# Patient Record
Sex: MALE | Race: WHITE | HISPANIC OR LATINO | Employment: FULL TIME | URBAN - METROPOLITAN AREA
[De-identification: names, ages, dates, MRNs, and addresses within clinical notes are randomized per-mention and may not be internally consistent; named-entity substitution may affect disease eponyms.]

---

## 2022-01-25 ENCOUNTER — OFFICE VISIT (OUTPATIENT)
Dept: FAMILY MEDICINE CLINIC | Facility: CLINIC | Age: 32
End: 2022-01-25
Payer: COMMERCIAL

## 2022-01-25 VITALS
WEIGHT: 162 LBS | HEIGHT: 65 IN | SYSTOLIC BLOOD PRESSURE: 110 MMHG | RESPIRATION RATE: 18 BRPM | DIASTOLIC BLOOD PRESSURE: 70 MMHG | BODY MASS INDEX: 26.99 KG/M2 | OXYGEN SATURATION: 99 % | HEART RATE: 77 BPM | TEMPERATURE: 97.2 F

## 2022-01-25 DIAGNOSIS — Z00.00 ROUTINE ADULT HEALTH MAINTENANCE: Primary | ICD-10-CM

## 2022-01-25 DIAGNOSIS — Z11.1 ENCOUNTER FOR PPD TEST: ICD-10-CM

## 2022-01-25 PROCEDURE — 3725F SCREEN DEPRESSION PERFORMED: CPT | Performed by: NURSE PRACTITIONER

## 2022-01-25 PROCEDURE — 86580 TB INTRADERMAL TEST: CPT

## 2022-01-25 PROCEDURE — 3008F BODY MASS INDEX DOCD: CPT | Performed by: NURSE PRACTITIONER

## 2022-01-25 PROCEDURE — 99385 PREV VISIT NEW AGE 18-39: CPT | Performed by: NURSE PRACTITIONER

## 2022-01-25 RX ORDER — VITAMIN B COMPLEX
1 CAPSULE ORAL DAILY
COMMUNITY

## 2022-01-25 RX ORDER — MULTIVITAMIN
1 TABLET ORAL DAILY
COMMUNITY

## 2022-01-27 ENCOUNTER — APPOINTMENT (OUTPATIENT)
Dept: RADIOLOGY | Facility: CLINIC | Age: 32
End: 2022-01-27
Payer: COMMERCIAL

## 2022-01-27 ENCOUNTER — CLINICAL SUPPORT (OUTPATIENT)
Dept: FAMILY MEDICINE CLINIC | Facility: CLINIC | Age: 32
End: 2022-01-27

## 2022-01-27 DIAGNOSIS — R76.11 POSITIVE PPD: ICD-10-CM

## 2022-01-27 DIAGNOSIS — R76.11 POSITIVE PPD: Primary | ICD-10-CM

## 2022-01-27 LAB
INDURATION: ABNORMAL MM
TB SKIN TEST: POSITIVE

## 2022-01-27 PROCEDURE — 71046 X-RAY EXAM CHEST 2 VIEWS: CPT

## 2022-01-30 LAB
GAMMA INTERFERON BACKGROUND BLD IA-ACNC: 0.02 IU/ML
M TB IFN-G CD4+ T-CELLS BLD-ACNC: 0.06 IU/ML
M TB IFN-G CD4+ T-CELLS BLD-ACNC: 0.11 IU/ML
MITOGEN IGNF BLD-ACNC: >10 IU/ML
QUANTIFERON INCUBATION COMMENT: NORMAL
QUANTIFERON-TB GOLD PLUS: NEGATIVE
SERVICE CMNT-IMP: NORMAL

## 2022-01-31 ENCOUNTER — TELEPHONE (OUTPATIENT)
Dept: FAMILY MEDICINE CLINIC | Facility: CLINIC | Age: 32
End: 2022-01-31

## 2022-02-01 ENCOUNTER — OFFICE VISIT (OUTPATIENT)
Dept: FAMILY MEDICINE CLINIC | Facility: CLINIC | Age: 32
End: 2022-02-01
Payer: COMMERCIAL

## 2022-02-01 VITALS
WEIGHT: 161 LBS | TEMPERATURE: 97.1 F | DIASTOLIC BLOOD PRESSURE: 66 MMHG | HEART RATE: 64 BPM | SYSTOLIC BLOOD PRESSURE: 110 MMHG | HEIGHT: 65 IN | OXYGEN SATURATION: 99 % | BODY MASS INDEX: 26.82 KG/M2 | RESPIRATION RATE: 18 BRPM

## 2022-02-01 DIAGNOSIS — R76.11 POSITIVE PPD: Primary | ICD-10-CM

## 2022-02-01 PROCEDURE — 3008F BODY MASS INDEX DOCD: CPT | Performed by: NURSE PRACTITIONER

## 2022-02-01 PROCEDURE — 99213 OFFICE O/P EST LOW 20 MIN: CPT | Performed by: NURSE PRACTITIONER

## 2022-02-01 NOTE — PROGRESS NOTES
Assessment/Plan:    We discussed that given positive PPD, he most likely was vaccinated in [facility] as a child  Quantiferon and CXR were negative  Documentation of this was provided for pt and reassurance provided  Advised going forward, he should not be screened via tuberculin skin test given that he will continue to have a positive result    1  Positive PPD            There are no Patient Instructions on file for this visit  No follow-ups on file  Subjective:      Patient ID: Chio Godfrey is a 32 y o  male  Chief Complaint   Patient presents with    Follow-up     review results  [facility]       Following up today on tuberculosis screening  He had a positive PPD and then had a f/u Quantiferon and chest Xray  At previous visit, pt recalled having had a negative PPD in the past, but did not recall being vaccinated  The following portions of the patient's history were reviewed and updated as appropriate: allergies, current medications, past family history, past medical history, past social history, past surgical history and problem list     Review of Systems   Constitutional: Negative  Respiratory: Negative  Cardiovascular: Negative  Gastrointestinal: Negative  Neurological: Negative  Current Outpatient Medications   Medication Sig Dispense Refill    b complex vitamins capsule Take 1 capsule by mouth daily      Multiple Vitamin (multivitamin) tablet Take 1 tablet by mouth daily       No current facility-administered medications for this visit  Objective:    /66   Pulse 64   Temp (!) 97 1 °F (36 2 °C)   Resp 18   Ht 5' 5" (1 651 m)   Wt 73 kg (161 lb)   SpO2 99%   BMI 26 79 kg/m²        Physical Exam  Vitals and nursing note reviewed  Constitutional:       Appearance: Normal appearance  He is well-developed  Cardiovascular:      Heart sounds: No murmur heard        Pulmonary:      Effort: Pulmonary effort is normal       Breath sounds: Normal breath sounds  Skin:     General: Skin is warm and dry  Neurological:      Mental Status: He is alert     Psychiatric:         Mood and Affect: Mood normal          Behavior: Behavior normal                 EUGENIO Gee

## 2022-02-01 NOTE — LETTER
February 1, 2022     Patient: Joanie Britt   YOB: 1990   Date of Visit: 2/1/2022       To Whom it May Concern:    Joanie Britt is under my professional care  He had a negative Quantiferon gold blood for tuberculosis screening on 1/28/22 and a negative chest Xray  He is not infected with pulmonary tuberculosis  It is recommended that he continue with Quantiferon gold should he require future screening instead of a tuberculin skin test       If you have any questions or concerns, please don't hesitate to call           Sincerely,          EUGENIO Wallace        CC: No Recipients

## 2022-05-27 ENCOUNTER — OFFICE VISIT (OUTPATIENT)
Dept: FAMILY MEDICINE CLINIC | Facility: CLINIC | Age: 32
End: 2022-05-27
Payer: COMMERCIAL

## 2022-05-27 VITALS
DIASTOLIC BLOOD PRESSURE: 66 MMHG | HEART RATE: 61 BPM | TEMPERATURE: 97.7 F | BODY MASS INDEX: 26.82 KG/M2 | HEIGHT: 65 IN | OXYGEN SATURATION: 99 % | WEIGHT: 161 LBS | RESPIRATION RATE: 18 BRPM | SYSTOLIC BLOOD PRESSURE: 110 MMHG

## 2022-05-27 DIAGNOSIS — L60.8 TOENAIL DEFORMITY: ICD-10-CM

## 2022-05-27 DIAGNOSIS — R59.0 POSTERIOR AURICULAR LYMPHADENOPATHY: Primary | ICD-10-CM

## 2022-05-27 PROCEDURE — 3008F BODY MASS INDEX DOCD: CPT | Performed by: NURSE PRACTITIONER

## 2022-05-27 PROCEDURE — 3725F SCREEN DEPRESSION PERFORMED: CPT | Performed by: NURSE PRACTITIONER

## 2022-05-27 PROCEDURE — 1036F TOBACCO NON-USER: CPT | Performed by: NURSE PRACTITIONER

## 2022-05-27 PROCEDURE — 99213 OFFICE O/P EST LOW 20 MIN: CPT | Performed by: NURSE PRACTITIONER

## 2022-05-27 RX ORDER — AMOXICILLIN 875 MG/1
875 TABLET, COATED ORAL 2 TIMES DAILY
Qty: 20 TABLET | Refills: 0 | Status: SHIPPED | OUTPATIENT
Start: 2022-05-27 | End: 2022-06-06

## 2022-05-27 NOTE — PATIENT INSTRUCTIONS
Take medication with food  It is important that you take the entire course of antibiotics prescribed  May also take a probiotic of your choice to maintain healthy GI carlos  Can take some probiotic and yogurt with the medication  Supportive care discussed and advised  Advised to RTO for any worsening and no improvement  Follow up for no improvement and worsening of conditions  Patient advised and educated when to see immediate medical care

## 2022-05-27 NOTE — PROGRESS NOTES
Assessment/Plan:  Discussed that possible posterior auricular lymphadenopathy is reactive and will start amoxicillin and if does not improve in 3 to 4 weeks then will follow back and will consider ultrasound of area  1  Posterior auricular lymphadenopathy  -     amoxicillin (AMOXIL) 875 mg tablet; Take 1 tablet (875 mg total) by mouth 2 (two) times a day for 10 days    2  Toenail deformity  -     Ambulatory Referral to Podiatry; Future            Patient Instructions: Take medication with food  It is important that you take the entire course of antibiotics prescribed  May also take a probiotic of your choice to maintain healthy GI carlos  Can take some probiotic and yogurt with the medication  Supportive care discussed and advised  Advised to RTO for any worsening and no improvement  Follow up for no improvement and worsening of conditions  Patient advised and educated when to see immediate medical care  Return if symptoms worsen or fail to improve  No future appointments  Subjective:      Patient ID: Justus Billings is a 32 y o  male  Chief Complaint   Patient presents with   Dianna Root noted behind R ear       [id number]         Vitals:  /66   Pulse 61   Temp 97 7 °F (36 5 °C)   Resp 18   Ht 5' 5" (1 651 m)   Wt 73 kg (161 lb)   SpO2 99%   BMI 26 79 kg/m²     HPI  Patient stated that noticed lump behind his right ear couple of days and hurts when moves head towards left side  Stated that lump has gotten better slightly  Denies fever, chills and injury  Also stated that play sports and had injury of right great toe which has resolved but still has some dried blood under his toenail which does not cause any issues but would like to get rid of it   Denies any weight loss          PHQ-2/9 Depression Screening    Little interest or pleasure in doing things: 0 - not at all  Feeling down, depressed, or hopeless: 0 - not at all  PHQ-2 Score: 0  PHQ-2 Interpretation: Negative depression screen             The following portions of the patient's history were reviewed and updated as appropriate: allergies, current medications, past family history, past medical history, past social history, past surgical history and problem list       Review of Systems   Constitutional: Negative for chills, diaphoresis, fatigue, fever and unexpected weight change  HENT: Negative for congestion, dental problem, drooling, ear discharge, ear pain, facial swelling, hearing loss, mouth sores, nosebleeds, postnasal drip, rhinorrhea, sinus pressure, sinus pain, sneezing, sore throat, tinnitus, trouble swallowing and voice change  As noted in HPI     Respiratory: Negative for cough, chest tightness, shortness of breath and wheezing  Cardiovascular: Negative  Gastrointestinal: Negative for abdominal pain, constipation, diarrhea, nausea and vomiting  Musculoskeletal: Negative  Skin:        As noted in HPI     Neurological: Negative for dizziness, light-headedness and headaches  Objective:    Social History     Tobacco Use   Smoking Status Never Smoker   Smokeless Tobacco Never Used       Allergies: No Known Allergies      Current Outpatient Medications   Medication Sig Dispense Refill    amoxicillin (AMOXIL) 875 mg tablet Take 1 tablet (875 mg total) by mouth 2 (two) times a day for 10 days 20 tablet 0    b complex vitamins capsule Take 1 capsule by mouth daily      Multiple Vitamin (multivitamin) tablet Take 1 tablet by mouth daily       No current facility-administered medications for this visit  Physical Exam  Vitals reviewed  Constitutional:       Appearance: Normal appearance  He is well-developed  HENT:      Head: Normocephalic  Right Ear: Tympanic membrane, ear canal and external ear normal       Left Ear: Tympanic membrane, ear canal and external ear normal       Nose: Nose normal       Right Sinus: No maxillary sinus tenderness or frontal sinus tenderness        Left Sinus: No maxillary sinus tenderness or frontal sinus tenderness  Mouth/Throat:      Mouth: No oral lesions  Pharynx: No oropharyngeal exudate or posterior oropharyngeal erythema  Cardiovascular:      Rate and Rhythm: Normal rate and regular rhythm  Heart sounds: Normal heart sounds  Pulmonary:      Effort: Pulmonary effort is normal       Breath sounds: Normal breath sounds  Musculoskeletal:         General: Normal range of motion  Cervical back: Neck supple  Lymphadenopathy:      Head:      Right side of head: Posterior auricular adenopathy present  Left side of head: No posterior auricular adenopathy  Cervical:      Right cervical: No superficial or posterior cervical adenopathy  Left cervical: No superficial or posterior cervical adenopathy  Skin:     General: Skin is warm and dry  Comments: Dried blood noted under right toenail but no swelling noted of toenail   Neurological:      Mental Status: He is alert and oriented to person, place, and time  Psychiatric:         Behavior: Behavior normal          Thought Content:  Thought content normal          Judgment: Judgment normal                      Enrigue Collet, CRNP

## 2022-10-07 ENCOUNTER — OFFICE VISIT (OUTPATIENT)
Dept: FAMILY MEDICINE CLINIC | Facility: CLINIC | Age: 32
End: 2022-10-07
Payer: COMMERCIAL

## 2022-10-07 VITALS
DIASTOLIC BLOOD PRESSURE: 60 MMHG | OXYGEN SATURATION: 99 % | SYSTOLIC BLOOD PRESSURE: 90 MMHG | HEIGHT: 66 IN | WEIGHT: 158 LBS | RESPIRATION RATE: 16 BRPM | TEMPERATURE: 98 F | HEART RATE: 81 BPM | BODY MASS INDEX: 25.39 KG/M2

## 2022-10-07 DIAGNOSIS — Z13.29 SCREENING FOR THYROID DISORDER: ICD-10-CM

## 2022-10-07 DIAGNOSIS — K21.9 GASTROESOPHAGEAL REFLUX DISEASE WITHOUT ESOPHAGITIS: ICD-10-CM

## 2022-10-07 DIAGNOSIS — Z13.6 SCREENING FOR CARDIOVASCULAR CONDITION: ICD-10-CM

## 2022-10-07 DIAGNOSIS — R14.0 ABDOMINAL BLOATING: ICD-10-CM

## 2022-10-07 DIAGNOSIS — Z23 NEED FOR VACCINATION: ICD-10-CM

## 2022-10-07 DIAGNOSIS — Z00.00 ROUTINE ADULT HEALTH MAINTENANCE: Primary | ICD-10-CM

## 2022-10-07 PROBLEM — E73.9 LACTOSE INTOLERANCE: Status: ACTIVE | Noted: 2022-10-07

## 2022-10-07 PROCEDURE — 99395 PREV VISIT EST AGE 18-39: CPT | Performed by: NURSE PRACTITIONER

## 2022-10-07 PROCEDURE — 90686 IIV4 VACC NO PRSV 0.5 ML IM: CPT

## 2022-10-07 PROCEDURE — 90715 TDAP VACCINE 7 YRS/> IM: CPT

## 2022-10-07 PROCEDURE — 90471 IMMUNIZATION ADMIN: CPT

## 2022-10-07 PROCEDURE — 90472 IMMUNIZATION ADMIN EACH ADD: CPT

## 2022-10-07 NOTE — PROGRESS NOTES
FAMILY PRACTICE HEALTH MAINTENANCE OFFICE VISIT  [facility]    NAME: Alex Conrad  AGE: 28 y o  SEX: male  : 1990     DATE: 10/10/2022    Assessment and Plan     Will check labs as below  Referral given for GI to discuss possible EGD for persistent GERD symptoms    1  Routine adult health maintenance    2  Need for vaccination  -     influenza vaccine, quadrivalent, 0 5 mL, preservative-free, for adult and pediatric patients 6 mos+ (AFLURIA, FLUARIX, FLULAVAL, FLUZONE)  -     TDAP VACCINE GREATER THAN OR EQUAL TO 6YO IM    3  Screening for cardiovascular condition  -     CBC and differential; Future  -     Comprehensive metabolic panel; Future  -     Lipid panel; Future    4  Screening for thyroid disorder  -     TSH, 3rd generation with Free T4 reflex; Future    5  Abdominal bloating  -     Celiac Disease Antibody Profile; Future  -     Ambulatory Referral to Gastroenterology; Future    6  Gastroesophageal reflux disease without esophagitis  -     Celiac Disease Antibody Profile; Future  -     Ambulatory Referral to Gastroenterology; Future      Patient Counseling:   Nutrition: Stressed importance of a well balanced diet, moderation of sodium/saturated fat, caloric balance and sufficient intake of fiber  Exercise: Stressed the importance of regular exercise with a goal of 150 minutes per week  Dental Health: Discussed daily flossing and brushing and regular dental visits     Immunizations reviewed: Up To Date, see orders  Discussed benefits of:  Screening labs  BMI Counseling: Body mass index is 25 89 kg/m²  Discussed with patient's BMI with him  The BMI is above normal  Exercise recommendations include moderate aerobic physical activity for 150 minutes/week  Return for Annual physical         Chief Complaint     Chief Complaint   Patient presents with   • Physical Exam     Janet Weinberg MA         History of Present Illness     Here today for CPE  Diagnosed with GERD in the past, occasional heartburn, but feels like food gets stuck when he swallows  Reports constipation, hard to go at times  Feels bloated  Sometimes does not move his bowels every day  Drinks about 60 ounces of water daily  Well Adult Physical   Patient here for a comprehensive physical exam       Diet and Physical Activity  Diet: well balanced diet  Exercise: frequently      Depression Screen  PHQ-2/9 Depression Screening    Little interest or pleasure in doing things: 0 - not at all  Feeling down, depressed, or hopeless: 0 - not at all  PHQ-2 Score: 0  PHQ-2 Interpretation: Negative depression screen          General Health  Hearing: Normal:  bilateral  Vision: no vision problems  Dental: regular dental visits and brushes teeth twice daily    Reproductive Health  No issues       The following portions of the patient's history were reviewed and updated as appropriate: allergies, current medications, past family history, past medical history, past social history, past surgical history and problem list     Review of Systems     Review of Systems   Constitutional: Negative  Respiratory: Negative  Cardiovascular: Negative  Gastrointestinal: Negative  Neurological: Negative  Psychiatric/Behavioral: Negative  Past Medical History     History reviewed  No pertinent past medical history      Past Surgical History     Past Surgical History:   Procedure Laterality Date   • NO PAST SURGERIES         Social History     Social History     Socioeconomic History   • Marital status: /Civil Union     Spouse name: None   • Number of children: None   • Years of education: None   • Highest education level: None   Occupational History   • None   Tobacco Use   • Smoking status: Never Smoker   • Smokeless tobacco: Never Used   Vaping Use   • Vaping Use: Never used   Substance and Sexual Activity   • Alcohol use: Never   • Drug use: Never   • Sexual activity: None   Other Topics Concern   • None   Social History Narrative   • None     Social Determinants of Health     Financial Resource Strain: Not on file   Food Insecurity: Not on file   Transportation Needs: Not on file   Physical Activity: Not on file   Stress: Not on file   Social Connections: Not on file   Intimate Partner Violence: Not on file   Housing Stability: Not on file       Family History     Family History   Problem Relation Age of Onset   • Hypertension Mother    • Diabetes Mother    • No Known Problems Father        Current Medications       Current Outpatient Medications:   •  b complex vitamins capsule, Take 1 capsule by mouth daily, Disp: , Rfl:   •  Multiple Vitamin (multivitamin) tablet, Take 1 tablet by mouth daily, Disp: , Rfl:      Allergies     No Known Allergies    Objective     BP 90/60   Pulse 81   Temp 98 °F (36 7 °C)   Resp 16   Ht 5' 5 5" (1 664 m)   Wt 71 7 kg (158 lb)   SpO2 99%   BMI 25 89 kg/m²      Physical Exam       Visual Acuity Screening    Right eye Left eye Both eyes   Without correction: 20/20 20/20 20/20   With correction:              [address]

## 2022-10-11 LAB
ALBUMIN SERPL-MCNC: 4.9 G/DL (ref 4–5)
ALBUMIN/GLOB SERPL: 2 {RATIO} (ref 1.2–2.2)
ALP SERPL-CCNC: 61 IU/L (ref 44–121)
ALT SERPL-CCNC: 12 IU/L (ref 0–44)
AST SERPL-CCNC: 19 IU/L (ref 0–40)
BASOPHILS # BLD AUTO: 0 X10E3/UL (ref 0–0.2)
BASOPHILS NFR BLD AUTO: 1 %
BILIRUB SERPL-MCNC: 0.7 MG/DL (ref 0–1.2)
BUN SERPL-MCNC: 14 MG/DL (ref 6–20)
BUN/CREAT SERPL: 14 (ref 9–20)
CALCIUM SERPL-MCNC: 9.6 MG/DL (ref 8.7–10.2)
CHLORIDE SERPL-SCNC: 103 MMOL/L (ref 96–106)
CHOLEST SERPL-MCNC: 217 MG/DL (ref 100–199)
CO2 SERPL-SCNC: 28 MMOL/L (ref 20–29)
CREAT SERPL-MCNC: 1 MG/DL (ref 0.76–1.27)
EGFR: 103 ML/MIN/1.73
ENDOMYSIUM IGA SER QL: NEGATIVE
EOSINOPHIL # BLD AUTO: 0.1 X10E3/UL (ref 0–0.4)
EOSINOPHIL NFR BLD AUTO: 2 %
ERYTHROCYTE [DISTWIDTH] IN BLOOD BY AUTOMATED COUNT: 18.2 % (ref 11.6–15.4)
GLOBULIN SER-MCNC: 2.5 G/DL (ref 1.5–4.5)
GLUCOSE SERPL-MCNC: 102 MG/DL (ref 70–99)
HCT VFR BLD AUTO: 37.4 % (ref 37.5–51)
HDLC SERPL-MCNC: 48 MG/DL
HGB BLD-MCNC: 11.8 G/DL (ref 13–17.7)
IGA SERPL-MCNC: 331 MG/DL (ref 90–386)
IMM GRANULOCYTES # BLD: 0 X10E3/UL (ref 0–0.1)
IMM GRANULOCYTES NFR BLD: 0 %
LDLC SERPL CALC-MCNC: 156 MG/DL (ref 0–99)
LYMPHOCYTES # BLD AUTO: 1.5 X10E3/UL (ref 0.7–3.1)
LYMPHOCYTES NFR BLD AUTO: 28 %
MCH RBC QN AUTO: 20.7 PG (ref 26.6–33)
MCHC RBC AUTO-ENTMCNC: 31.6 G/DL (ref 31.5–35.7)
MCV RBC AUTO: 66 FL (ref 79–97)
MICRODELETION SYND BLD/T FISH: NORMAL
MONOCYTES # BLD AUTO: 0.4 X10E3/UL (ref 0.1–0.9)
MONOCYTES NFR BLD AUTO: 7 %
NEUTROPHILS # BLD AUTO: 3.3 X10E3/UL (ref 1.4–7)
NEUTROPHILS NFR BLD AUTO: 62 %
PLATELET # BLD AUTO: 294 X10E3/UL (ref 150–450)
POTASSIUM SERPL-SCNC: 5 MMOL/L (ref 3.5–5.2)
PROT SERPL-MCNC: 7.4 G/DL (ref 6–8.5)
RBC # BLD AUTO: 5.7 X10E6/UL (ref 4.14–5.8)
SL AMB VLDL CHOLESTEROL CALC: 13 MG/DL (ref 5–40)
SODIUM SERPL-SCNC: 142 MMOL/L (ref 134–144)
TRIGL SERPL-MCNC: 72 MG/DL (ref 0–149)
TSH SERPL DL<=0.005 MIU/L-ACNC: 1.72 UIU/ML (ref 0.45–4.5)
TTG IGA SER-ACNC: <2 U/ML (ref 0–3)
WBC # BLD AUTO: 5.3 X10E3/UL (ref 3.4–10.8)

## 2022-10-18 ENCOUNTER — TELEPHONE (OUTPATIENT)
Dept: FAMILY MEDICINE CLINIC | Facility: CLINIC | Age: 32
End: 2022-10-18

## 2022-10-18 DIAGNOSIS — D50.9 IRON DEFICIENCY ANEMIA, UNSPECIFIED IRON DEFICIENCY ANEMIA TYPE: Primary | ICD-10-CM

## 2022-10-18 NOTE — TELEPHONE ENCOUNTER
Spoke w/ pt  Discussed low carb/low fat diet to improve sugar and cholesterol levels  Pt reports he has a history of iron deficiency anemia which improved with iron supplementation  Will repeat labs in 2-3 month after starting iron   Nancy Presley

## 2023-02-17 ENCOUNTER — TELEPHONE (OUTPATIENT)
Dept: GASTROENTEROLOGY | Facility: CLINIC | Age: 33
End: 2023-02-17

## 2023-02-17 ENCOUNTER — OFFICE VISIT (OUTPATIENT)
Dept: GASTROENTEROLOGY | Facility: CLINIC | Age: 33
End: 2023-02-17

## 2023-02-17 VITALS
HEIGHT: 65 IN | HEART RATE: 57 BPM | DIASTOLIC BLOOD PRESSURE: 83 MMHG | BODY MASS INDEX: 26.16 KG/M2 | SYSTOLIC BLOOD PRESSURE: 130 MMHG | WEIGHT: 157 LBS

## 2023-02-17 DIAGNOSIS — A04.8 H. PYLORI INFECTION: ICD-10-CM

## 2023-02-17 DIAGNOSIS — K59.00 CONSTIPATION, UNSPECIFIED CONSTIPATION TYPE: ICD-10-CM

## 2023-02-17 DIAGNOSIS — R14.0 ABDOMINAL BLOATING: ICD-10-CM

## 2023-02-17 DIAGNOSIS — D50.9 IRON DEFICIENCY ANEMIA, UNSPECIFIED IRON DEFICIENCY ANEMIA TYPE: Primary | ICD-10-CM

## 2023-02-17 DIAGNOSIS — K21.9 GASTROESOPHAGEAL REFLUX DISEASE WITHOUT ESOPHAGITIS: ICD-10-CM

## 2023-02-17 NOTE — PROGRESS NOTES
[facility] - Outpatient Consultation  Stephen Carlson 28 y o  male MRN: [id number]  Encounter: [id number]          ASSESSMENT AND PLAN:      1  Abdominal bloating  Could be related to H  pylori or hypomotility of the GI tract and we will plan for upper endoscopy for further evaluate  - Ambulatory Referral to Gastroenterology    2  Gastroesophageal reflux disease without esophagitis  Denies any significant reflux symptoms currently but does have dyspeptic symptoms primarily early satiety, plan for EGD for further evaluation  - Ambulatory Referral to Gastroenterology    3  Iron deficiency anemia, unspecified iron deficiency anemia type  Pt with microcytic anemia and did not have iron studies but also reports he was low in B12, will order iron studies along with B12 and repeat CBC and we will plan for EGD and colonoscopy for further evaluation of GI blood loss although he denies any overt symptoms of melena or bright red blood per rectum but could have malabsorptive process with negative celiac panel rule out IBD, rule out any pernicious anemia  - CBC and differential; Future  - Iron Panel (Includes Ferritin, Iron Sat%, Iron, and TIBC); Future  - Vitamin B12; Future  - polyethylene glycol (GOLYTELY) 4000 mL solution; Take 4,000 mL by mouth once for 1 dose  Dispense: 4000 mL; Refill: 0    4  H  pylori infection  Pt with history of H  pylori infection which was treated years ago but there was never any confirmation of eradication with stool test   Pt also at risk of peptic ulcer disease if ongoing H pylori infection, would recommend stool testing in interim since procedures will not be scheduled till April  - H  pylori antigen, stool; Future    5   Constipation, unspecified constipation type  With constipation with recent normal thyroid studies, would recommend to continue high-fiber diet as he has been doing and implement MiraLAX 17 g in 4 ounces of liquid daily       Will make further recommendations once endoscopy and colonoscopy have been performed    ______________________________________________________________________    HPI: This is a 55-year-old male who presents for evaluation of abdominal bloating and constipation  Patient reports that he previously lived in [place] and he had an endoscopy in the past and was told that he had H  pylori but never had any testing eradication  Patient does feel early satiety and bloating  Has not lost weight  Feels like food is slow to digest in his stomach and he does have difficulty moving his bowels and only moving his bowels every 3 to 4 days and sometimes will take a fiber supplement  Denies any family history of gastrointestinal issues or malignancy or any celiac disease or IBD  Noted to have microcytic anemia and was supposed to have iron studies done and he also tells me he has B12 deficiency in the past   He does take iron on occasion but has not taken this for some time and he does take B12 when he remembers  Denies any significant diarrheal symptoms and did have celiac panel last year which was negative  Denies any regular NSAID use  REVIEW OF SYSTEMS:    CONSTITUTIONAL: Denies any fever, chills, rigors, and weight loss  HEENT: No earache or tinnitus  Denies hearing loss or visual disturbances  CARDIOVASCULAR: No chest pain or palpitations  RESPIRATORY: Denies any cough, hemoptysis, shortness of breath or dyspnea on exertion  GASTROINTESTINAL: As noted in the History of Present Illness  GENITOURINARY: No problems with urination  Denies any hematuria or dysuria  NEUROLOGIC: No dizziness or vertigo, denies headaches  MUSCULOSKELETAL: Denies any muscle or joint pain  SKIN: Denies skin rashes or itching  ENDOCRINE: Denies excessive thirst  Denies intolerance to heat or cold  PSYCHOSOCIAL: Denies depression or anxiety  Denies any recent memory loss  Historical Information   History reviewed   No pertinent past medical history  Past Surgical History:   Procedure Laterality Date   • NO PAST SURGERIES       Social History   Social History     Substance and Sexual Activity   Alcohol Use Never     Social History     Substance and Sexual Activity   Drug Use Never     Social History     Tobacco Use   Smoking Status Never   Smokeless Tobacco Never     Family History   Problem Relation Age of Onset   • Hypertension Mother    • Diabetes Mother    • No Known Problems Father        Meds/Allergies       Current Outpatient Medications:   •  b complex vitamins capsule  •  Multiple Vitamin (multivitamin) tablet  •  polyethylene glycol (GOLYTELY) 4000 mL solution    No Known Allergies        Objective     Blood pressure 130/83, pulse 57, height 5' 5" (1 651 m), weight 71 2 kg (157 lb)  Body mass index is 26 13 kg/m²  PHYSICAL EXAM:      General Appearance:   Alert, cooperative, no distress   HEENT:   Normocephalic, atraumatic, anicteric      Neck:  Supple, symmetrical, trachea midline   Lungs:   Clear to auscultation bilaterally; no rales, rhonchi or wheezing; respirations unlabored    Heart[de-identified]   Regular rate and rhythm; no murmur, rub, or gallop  Abdomen:   Soft, non-tender, non-distended; normal bowel sounds; no masses, no organomegaly    Genitalia:   Deferred    Rectal:   Deferred    Extremities:  No cyanosis, clubbing or edema    Pulses:  2+ and symmetric    Skin:  No jaundice, rashes, or lesions    Lymph nodes:  No palpable cervical lymphadenopathy        Lab Results:   No visits with results within 1 Day(s) from this visit     Latest known visit with results is:   Orders Only on 10/10/2022   Component Date Value   • Endomysial IgA 10/10/2022 Negative    • TISSUE TRANSGLUTAMINASE * 10/10/2022 <2    • IgA 10/10/2022 331    • White Blood Cell Count 10/10/2022 5 3    • Red Blood Cell Count 10/10/2022 5 70    • Hemoglobin 10/10/2022 11 8 (L)    • HCT 10/10/2022 37 4 (L)    • MCV 10/10/2022 66 (L)    • MCH 10/10/2022 20 7 (L)    • MCHC 10/10/2022 31 6    • RDW 10/10/2022 18 2 (H)    • Platelet Count 15/28/6142 294    • Neutrophils 10/10/2022 62    • Lymphocytes 10/10/2022 28    • Monocytes 10/10/2022 7    • Eosinophils 10/10/2022 2    • Basophils PCT 10/10/2022 1    • Neutrophils (Absolute) 10/10/2022 3 3    • Lymphocytes (Absolute) 10/10/2022 1 5    • Monocytes (Absolute) 10/10/2022 0 4    • Eosinophils (Absolute) 10/10/2022 0 1    • Basophils ABS 10/10/2022 0 0    • Immature Granulocytes 10/10/2022 0    • Immature Granulocytes (A* 10/10/2022 0 0    • Glucose, Random 10/10/2022 102 (H)    • BUN 10/10/2022 14    • Creatinine 10/10/2022 1 00    • eGFR 10/10/2022 103    • SL AMB BUN/CREATININE RA* 10/10/2022 14    • Sodium 10/10/2022 142    • Potassium 10/10/2022 5 0    • Chloride 10/10/2022 103    • CO2 10/10/2022 28    • CALCIUM 10/10/2022 9 6    • Protein, Total 10/10/2022 7 4    • Albumin 10/10/2022 4 9    • Globulin, Total 10/10/2022 2 5    • Albumin/Globulin Ratio 10/10/2022 2 0    • TOTAL BILIRUBIN 10/10/2022 0 7    • Alk Phos Isoenzymes 10/10/2022 61    • AST 10/10/2022 19    • ALT 10/10/2022 12    • Cholesterol, Total 10/10/2022 217 (H)    • Triglycerides 10/10/2022 72    • HDL 10/10/2022 48    • VLDL Cholesterol Calcula* 10/10/2022 13    • LDL Calculated 10/10/2022 156 (H)    • TSH 10/10/2022 1 720    • Interpretation 10/10/2022 Note          Radiology Results:   No results found

## 2023-02-17 NOTE — TELEPHONE ENCOUNTER
Scheduled date of EGD/colonoscopy (as of today):04/07/23  Physician performing EGD/colonoscopy:Dr Pinky Welch  Location of EGD/colonoscopy:[facility]  Desired bowel prep reviewed with patient:Denise  Instructions reviewed with patient by:maira  Clearances:  n/a

## 2023-02-17 NOTE — TELEPHONE ENCOUNTER
[facility] Assessment    Name: Pasquale Stokes  YOB: 1990  Last Height: 5' 5" (1 651 m)  Last weight: 71 2 kg (157 lb)  BMI: 26 13 kg/m²  Procedure: Colon and Egd  Diagnosis:   Date of procedure: 4/7/23  Prep: Golytely  Responsible : Karen Ramos  Phone#: [phone]  Name completing form: Khadar Smoker  Date form completed: 02/17/23      If the patient answers yes to any of these questions, schedule in a hospital  Are you pregnant: No  Do you rely on a wheelchair for mobility: No  Have you been diagnosed with End Stage Renal Disease (ESRD): No  Do you need oxygen during the day: No  Have you had a heart attack or stroke within the past three months: No  Have you had a seizure within the past three months: No  Have you ever been informed by anesthesia that you have a difficult airway: No  Additional Questions  Have you had any cardiac testing or are under the care of a Cardiologist (see cardiac list): No  Cardiac list:   Do you have an implanted cardiac defibrillator: No (Comment:  This patient should be scheduled in the hospital)    Have any bleeding problems, such as anemia or hemophilia (If patient has H&H result below 8, schedule in hospital   H&H must be within 30 days of procedure): Yes (Comment: Obtain H&H lab result)    Had an organ transplant within the past 3 months: No    Do you have any present infections: No  Do you get short of breath when walking a few blocks: No  Have you been diagnosed with diabetes: No  Comments (provide cardiac provider information if applicable): No bleeding problem, pt anemic-getting CBC done

## 2023-02-20 ENCOUNTER — TELEPHONE (OUTPATIENT)
Dept: OTHER | Facility: OTHER | Age: 33
End: 2023-02-20

## 2023-02-20 NOTE — TELEPHONE ENCOUNTER
Call from patient requesting call back to clarify his medication that he is supposed to start taking as he is unsure on taking it since it is the same as what they have him taking for colonoscopy prep  Patient also asking for his blood work results  Please return his call

## 2023-02-21 NOTE — TELEPHONE ENCOUNTER
SPOKE WITH PT, REVIEWED MIRALAX INSTRUCTIONS PER OFFICE NOTE, DISCUSSED RECENT RESULTS, EDUCATED, ALL QUESTIONS ANSWERED

## 2023-03-24 ENCOUNTER — TELEPHONE (OUTPATIENT)
Dept: GASTROENTEROLOGY | Facility: CLINIC | Age: 33
End: 2023-03-24

## 2023-03-24 NOTE — TELEPHONE ENCOUNTER
Will call pt in a few days if do not see blood work results back as hgb was low and needs to be repeated per Francesco Avila  Pt is scheduled on 4/7/23 at [facility] for EGD/colon

## 2023-03-28 ENCOUNTER — TELEPHONE (OUTPATIENT)
Dept: GASTROENTEROLOGY | Facility: CLINIC | Age: 33
End: 2023-03-28

## 2023-03-28 DIAGNOSIS — D64.9 ANEMIA, UNSPECIFIED TYPE: Primary | ICD-10-CM

## 2023-03-28 NOTE — TELEPHONE ENCOUNTER
Patient returning missed call  Relayed message to the patient  He stated that he had the labs done on 2/18/23

## 2023-03-28 NOTE — TELEPHONE ENCOUNTER
I lmom for pt to please call back to let us know if he has had his blood work done yet  Will check chart again in a few days and will call pt again if do not hear back from him or received blood work results

## 2023-03-28 NOTE — TELEPHONE ENCOUNTER
Called and spoke to pt to inform that he needs to have a HGB done within one month of procedure date which is 4/7/23  Advised pt that he does not need to fast   I have a message to the Provider pool asking for a new script to be created for the HGB    Once created, will email to pt per his request

## 2023-03-28 NOTE — TELEPHONE ENCOUNTER
Can a script please be created for pt as needs within one month of procedure for HGB? Thank you so much for your help!

## 2023-04-02 LAB
BASOPHILS # BLD AUTO: 0 X10E3/UL (ref 0–0.2)
BASOPHILS NFR BLD AUTO: 0 %
EOSINOPHIL # BLD AUTO: 0.1 X10E3/UL (ref 0–0.4)
EOSINOPHIL NFR BLD AUTO: 1 %
ERYTHROCYTE [DISTWIDTH] IN BLOOD BY AUTOMATED COUNT: 19.5 % (ref 11.6–15.4)
HCT VFR BLD AUTO: 38.5 % (ref 37.5–51)
HGB BLD-MCNC: 12.1 G/DL (ref 13–17.7)
IMM GRANULOCYTES # BLD: 0 X10E3/UL (ref 0–0.1)
IMM GRANULOCYTES NFR BLD: 0 %
LYMPHOCYTES # BLD AUTO: 2 X10E3/UL (ref 0.7–3.1)
LYMPHOCYTES NFR BLD AUTO: 38 %
MCH RBC QN AUTO: 20.9 PG (ref 26.6–33)
MCHC RBC AUTO-ENTMCNC: 31.4 G/DL (ref 31.5–35.7)
MCV RBC AUTO: 66 FL (ref 79–97)
MONOCYTES # BLD AUTO: 0.4 X10E3/UL (ref 0.1–0.9)
MONOCYTES NFR BLD AUTO: 8 %
NEUTROPHILS # BLD AUTO: 2.9 X10E3/UL (ref 1.4–7)
NEUTROPHILS NFR BLD AUTO: 53 %
PLATELET # BLD AUTO: 182 X10E3/UL (ref 150–450)
RBC # BLD AUTO: 5.8 X10E6/UL (ref 4.14–5.8)
WBC # BLD AUTO: 5.4 X10E3/UL (ref 3.4–10.8)

## 2023-04-07 ENCOUNTER — ANESTHESIA EVENT (OUTPATIENT)
Dept: GASTROENTEROLOGY | Facility: AMBULATORY SURGERY CENTER | Age: 33
End: 2023-04-07

## 2023-04-07 ENCOUNTER — ANESTHESIA (OUTPATIENT)
Dept: GASTROENTEROLOGY | Facility: AMBULATORY SURGERY CENTER | Age: 33
End: 2023-04-07

## 2023-04-07 RX ORDER — PROPOFOL 10 MG/ML
INJECTION, EMULSION INTRAVENOUS AS NEEDED
Status: DISCONTINUED | OUTPATIENT
Start: 2023-04-07 | End: 2023-04-07

## 2023-04-07 RX ORDER — SODIUM CHLORIDE, SODIUM LACTATE, POTASSIUM CHLORIDE, CALCIUM CHLORIDE 600; 310; 30; 20 MG/100ML; MG/100ML; MG/100ML; MG/100ML
INJECTION, SOLUTION INTRAVENOUS CONTINUOUS PRN
Status: DISCONTINUED | OUTPATIENT
Start: 2023-04-07 | End: 2023-04-07

## 2023-04-07 RX ORDER — LIDOCAINE HYDROCHLORIDE 20 MG/ML
INJECTION, SOLUTION EPIDURAL; INFILTRATION; INTRACAUDAL; PERINEURAL AS NEEDED
Status: DISCONTINUED | OUTPATIENT
Start: 2023-04-07 | End: 2023-04-07

## 2023-04-07 RX ADMIN — PROPOFOL 50 MG: 10 INJECTION, EMULSION INTRAVENOUS at 07:57

## 2023-04-07 RX ADMIN — PROPOFOL 50 MG: 10 INJECTION, EMULSION INTRAVENOUS at 08:03

## 2023-04-07 RX ADMIN — LIDOCAINE HYDROCHLORIDE 100 MG: 20 INJECTION, SOLUTION EPIDURAL; INFILTRATION; INTRACAUDAL; PERINEURAL at 07:54

## 2023-04-07 RX ADMIN — PROPOFOL 50 MG: 10 INJECTION, EMULSION INTRAVENOUS at 08:10

## 2023-04-07 RX ADMIN — PROPOFOL 50 MG: 10 INJECTION, EMULSION INTRAVENOUS at 07:56

## 2023-04-07 RX ADMIN — PROPOFOL 50 MG: 10 INJECTION, EMULSION INTRAVENOUS at 08:12

## 2023-04-07 RX ADMIN — SODIUM CHLORIDE, SODIUM LACTATE, POTASSIUM CHLORIDE, CALCIUM CHLORIDE: 600; 310; 30; 20 INJECTION, SOLUTION INTRAVENOUS at 07:54

## 2023-04-07 RX ADMIN — PROPOFOL 50 MG: 10 INJECTION, EMULSION INTRAVENOUS at 08:01

## 2023-04-07 RX ADMIN — PROPOFOL 50 MG: 10 INJECTION, EMULSION INTRAVENOUS at 08:14

## 2023-04-07 RX ADMIN — PROPOFOL 50 MG: 10 INJECTION, EMULSION INTRAVENOUS at 08:08

## 2023-04-07 RX ADMIN — PROPOFOL 150 MG: 10 INJECTION, EMULSION INTRAVENOUS at 07:54

## 2023-04-07 RX ADMIN — PROPOFOL 50 MG: 10 INJECTION, EMULSION INTRAVENOUS at 07:59

## 2023-04-07 RX ADMIN — PROPOFOL 50 MG: 10 INJECTION, EMULSION INTRAVENOUS at 08:05

## 2023-04-07 NOTE — ANESTHESIA POSTPROCEDURE EVALUATION
Post-Op Assessment Note    CV Status:  Stable  Pain Score: 0    Pain management: adequate     Mental Status:  Alert and awake   Hydration Status:  Euvolemic   PONV Controlled:  Controlled   Airway Patency:  Patent      Post Op Vitals Reviewed: Yes      Staff: Anesthesiologist, CRNA         There were no known notable events for this encounter      BP 95/55 (04/07/23 0820)    Temp     Pulse 72 (04/07/23 0820)   Resp 18 (04/07/23 0820)    SpO2 98 % (04/07/23 0820)

## 2023-04-07 NOTE — ANESTHESIA PREPROCEDURE EVALUATION
Procedure:  COLONOSCOPY  EGD    Relevant Problems   ANESTHESIA (within normal limits)      CARDIO   (-) HTN (hypertension)   (-) MI (myocardial infarction) (HCC)      ENDO   (-) Diabetes mellitus, type 2 (HCC)   (-) Hyperthyroidism   (-) Hypothyroidism      GI/HEPATIC   (+) Gastroesophageal reflux disease without esophagitis      /RENAL (within normal limits)      HEMATOLOGY (within normal limits)      MUSCULOSKELETAL (within normal limits)      NEURO/PSYCH (within normal limits)      PULMONARY   (-) Asthma   (-) Sleep apnea        Physical Exam    Airway    Mallampati score: III  TM Distance: >3 FB  Neck ROM: full     Dental   No notable dental hx     Cardiovascular      Pulmonary      Other Findings        Anesthesia Plan  ASA Score- 2     Anesthesia Type- IV sedation with anesthesia with ASA Monitors  Additional Monitors:   Airway Plan:           Plan Factors-Exercise tolerance (METS): >4 METS  Chart reviewed  Existing labs reviewed  Patient summary reviewed  Patient is not a current smoker  Induction- intravenous  Postoperative Plan-     Informed Consent- Anesthetic plan and risks discussed with patient  I personally reviewed this patient with the CRNA  Discussed and agreed on the Anesthesia Plan with the CRNA  Lamin Bal

## 2023-06-19 ENCOUNTER — APPOINTMENT (OUTPATIENT)
Dept: RADIOLOGY | Facility: CLINIC | Age: 33
End: 2023-06-19
Payer: COMMERCIAL

## 2023-06-19 ENCOUNTER — OFFICE VISIT (OUTPATIENT)
Dept: OBGYN CLINIC | Facility: CLINIC | Age: 33
End: 2023-06-19
Payer: COMMERCIAL

## 2023-06-19 VITALS
HEIGHT: 65 IN | DIASTOLIC BLOOD PRESSURE: 78 MMHG | BODY MASS INDEX: 25.66 KG/M2 | SYSTOLIC BLOOD PRESSURE: 120 MMHG | WEIGHT: 154 LBS | HEART RATE: 60 BPM

## 2023-06-19 DIAGNOSIS — M25.512 LEFT SHOULDER PAIN, UNSPECIFIED CHRONICITY: ICD-10-CM

## 2023-06-19 DIAGNOSIS — S43.52XA ACROMIOCLAVICULAR SPRAIN, LEFT, INITIAL ENCOUNTER: ICD-10-CM

## 2023-06-19 DIAGNOSIS — M25.512 LEFT SHOULDER PAIN, UNSPECIFIED CHRONICITY: Primary | ICD-10-CM

## 2023-06-19 PROCEDURE — 99203 OFFICE O/P NEW LOW 30 MIN: CPT | Performed by: PHYSICIAN ASSISTANT

## 2023-06-19 PROCEDURE — 73030 X-RAY EXAM OF SHOULDER: CPT

## 2023-06-19 NOTE — PROGRESS NOTES
Assessment/Plan:  1  Left shoulder pain, unspecified chronicity  XR shoulder 2+ vw left      2  Acromioclavicular sprain, left, initial encounter          The patient does appear to have a sprain of the left AC joint  X-rays do not show any evidence of separation though  His clavicle is stable on examination today and not ballotable  I did advise refraining from any heavy lifting in the gym for the next month  He can continue to play soccer as tolerated  I did advise ice for the shoulder  If he begins to develop a deformity about to the [facility] [place] joint, he will follow-up immediately for repeat evaluation  If he fails make progress over the next month, he will follow-up for repeat evaluation  Subjective:   Quique Ferrari is a 28 y o  male who presents today for evaluation of his left shoulder  He sustained a fall directly onto the shoulder while playing soccer about 2 weeks ago  He had significant pain about the superolateral shoulder at that time, which has improved some over the last couple weeks  He has returned to playing soccer, which has exacerbated his pain some  He notes fair range of motion of the shoulder at this point  He denies any significant deformity about the shoulder  He notes good sensation of the left upper extremity  Review of Systems   Constitutional: Negative  Negative for chills and fever  HENT: Negative  Negative for ear pain and sore throat  Eyes: Negative  Negative for pain and redness  Respiratory: Negative  Negative for shortness of breath and wheezing  Cardiovascular: Negative for chest pain and palpitations  Gastrointestinal: Negative  Negative for abdominal pain and blood in stool  Endocrine: Negative  Negative for polydipsia and polyuria  Genitourinary: Negative  Negative for difficulty urinating and dysuria  Musculoskeletal:        As noted in HPI   Skin: Negative  Negative for pallor and rash  Neurological: Negative    Negative for dizziness and numbness  Hematological: Negative  Negative for adenopathy  Does not bruise/bleed easily  Psychiatric/Behavioral: Negative  Negative for confusion and suicidal ideas  Past Medical History:   Diagnosis Date   • Anemia    • Bloating    • Constipation    • GERD (gastroesophageal reflux disease)    • History of Helicobacter pylori infection    • Irregular heart beat     per ekg 2020 peter no further workup       Past Surgical History:   Procedure Laterality Date   • NO PAST SURGERIES         Family History   Problem Relation Age of Onset   • Hypertension Mother    • Diabetes Mother    • No Known Problems Father        Social History     Occupational History   • Not on file   Tobacco Use   • Smoking status: Never   • Smokeless tobacco: Never   Vaping Use   • Vaping Use: Never used   Substance and Sexual Activity   • Alcohol use: Never   • Drug use: Never   • Sexual activity: Not on file         Current Outpatient Medications:   •  b complex vitamins capsule, Take 1 capsule by mouth daily, Disp: , Rfl:   •  Multiple Vitamin (multivitamin) tablet, Take 1 tablet by mouth daily, Disp: , Rfl:     No Known Allergies    Objective:  Vitals:    06/19/23 1550   BP: 120/78   Pulse: 60            Left Shoulder Exam     Tenderness   The patient is experiencing tenderness in the acromioclavicular joint  Range of Motion   External rotation: 70   Forward flexion: 160   Internal rotation 0 degrees: L1     Muscle Strength   Abduction: 4/5   Internal rotation: 5/5   External rotation: 5/5     Tests   Cross arm: positive  Drop arm: negative    Other   Erythema: absent  Sensation: normal  Pulse: present     Comments:  No AC joint step off deformity  Clavicle not ballotable            Physical Exam  Constitutional:       General: He is not in acute distress  Appearance: He is well-developed  HENT:      Head: Normocephalic and atraumatic  Eyes:      General: No scleral icterus       Conjunctiva/sclera: Conjunctivae normal    Neck:      Vascular: No JVD  Cardiovascular:      Rate and Rhythm: Normal rate  Pulmonary:      Effort: Pulmonary effort is normal  No respiratory distress  Skin:     General: Skin is warm  Neurological:      Mental Status: He is alert and oriented to person, place, and time  Coordination: Coordination normal          I have personally reviewed pertinent films in PACS and my interpretation is as follows:  Xrays left shoulder: No acute osseous abnormality      This document was created using speech voice recognition software  Grammatical errors, random word insertions, pronoun errors, and incomplete sentences are an occasional consequence of this system due to software limitations, ambient noise, and hardware issues  Any formal questions or concerns about content, text, or information contained within the body of this dictation should be directly addressed to the provider for clarification

## 2024-01-04 ENCOUNTER — OFFICE VISIT (OUTPATIENT)
Dept: FAMILY MEDICINE CLINIC | Facility: CLINIC | Age: 34
End: 2024-01-04
Payer: COMMERCIAL

## 2024-01-04 VITALS
BODY MASS INDEX: 26.29 KG/M2 | TEMPERATURE: 97.6 F | RESPIRATION RATE: 18 BRPM | SYSTOLIC BLOOD PRESSURE: 128 MMHG | WEIGHT: 158 LBS | HEART RATE: 77 BPM | DIASTOLIC BLOOD PRESSURE: 80 MMHG

## 2024-01-04 DIAGNOSIS — R39.9 UTI SYMPTOMS: Primary | ICD-10-CM

## 2024-01-04 LAB
SL AMB  POCT GLUCOSE, UA: NORMAL
SL AMB LEUKOCYTE ESTERASE,UA: NORMAL
SL AMB POCT BILIRUBIN,UA: NORMAL
SL AMB POCT BLOOD,UA: NORMAL
SL AMB POCT CLARITY,UA: CLEAR
SL AMB POCT COLOR,UA: YELLOW
SL AMB POCT KETONES,UA: NORMAL
SL AMB POCT NITRITE,UA: NORMAL
SL AMB POCT PH,UA: 6.5
SL AMB POCT SPECIFIC GRAVITY,UA: 1.03
SL AMB POCT URINE PROTEIN: NORMAL
SL AMB POCT UROBILINOGEN: 0.2

## 2024-01-04 PROCEDURE — 99213 OFFICE O/P EST LOW 20 MIN: CPT | Performed by: NURSE PRACTITIONER

## 2024-01-04 PROCEDURE — 81003 URINALYSIS AUTO W/O SCOPE: CPT | Performed by: NURSE PRACTITIONER

## 2024-01-04 NOTE — PROGRESS NOTES
Assessment/Plan:    Urine sent for culture  Discussed blood in ejaculate is usually no cause for concern, however could consider additional eval if this recurs.    1. UTI symptoms  -     POCT urine dip auto non-scope  -     Urine culture        No results found for this or any previous visit (from the past 24 hour(s)).        There are no Patient Instructions on file for this visit.    Return if symptoms worsen or fail to improve.    Subjective:      Patient ID: Ihsan Guillen is a 33 y.o. male.    Chief Complaint   Patient presents with   • Urinary Tract Infection     Sas/cma       Yesterday he noticed a streak of blood during urination x2.  Started the day after intercourse.  Had mild discomfort when trying to urinate, otherwise no dysuria or urinary symptoms.  He denies any fevers or flank pain.            The following portions of the patient's history were reviewed and updated as appropriate: allergies, current medications, past family history, past medical history, past social history, past surgical history and problem list.    Review of Systems   Constitutional:  Negative for chills and fever.   Gastrointestinal: Negative.    Genitourinary:         See HPI         Current Outpatient Medications   Medication Sig Dispense Refill   • b complex vitamins capsule Take 1 capsule by mouth daily     • Multiple Vitamin (multivitamin) tablet Take 1 tablet by mouth daily       No current facility-administered medications for this visit.       Objective:    /80   Pulse 77   Temp 97.6 °F (36.4 °C)   Resp 18   Wt 71.7 kg (158 lb)   BMI 26.29 kg/m²        Physical Exam  Vitals reviewed.   Constitutional:       General: He is not in acute distress.     Appearance: Normal appearance. He is well-developed. He is not ill-appearing or diaphoretic.   Eyes:      Conjunctiva/sclera: Conjunctivae normal.   Pulmonary:      Effort: Pulmonary effort is normal. No respiratory distress.   Skin:     Coloration: Skin is not  pale.   Neurological:      Mental Status: He is alert.   Psychiatric:         Mood and Affect: Mood normal.         Speech: Speech normal.         Behavior: Behavior normal.                EUGENIO Jordan

## 2024-01-06 LAB
BACTERIA UR CULT: NORMAL
Lab: NORMAL

## 2024-07-24 ENCOUNTER — OFFICE VISIT (OUTPATIENT)
Dept: FAMILY MEDICINE CLINIC | Facility: CLINIC | Age: 34
End: 2024-07-24
Payer: COMMERCIAL

## 2024-07-24 VITALS
RESPIRATION RATE: 18 BRPM | HEART RATE: 77 BPM | WEIGHT: 162 LBS | TEMPERATURE: 97.2 F | HEIGHT: 65 IN | DIASTOLIC BLOOD PRESSURE: 60 MMHG | SYSTOLIC BLOOD PRESSURE: 100 MMHG | BODY MASS INDEX: 26.99 KG/M2 | OXYGEN SATURATION: 99 %

## 2024-07-24 DIAGNOSIS — D50.9 IRON DEFICIENCY ANEMIA, UNSPECIFIED IRON DEFICIENCY ANEMIA TYPE: ICD-10-CM

## 2024-07-24 DIAGNOSIS — Z13.29 SCREENING FOR THYROID DISORDER: ICD-10-CM

## 2024-07-24 DIAGNOSIS — K59.01 SLOW TRANSIT CONSTIPATION: Primary | ICD-10-CM

## 2024-07-24 DIAGNOSIS — Z13.6 SCREENING FOR CARDIOVASCULAR CONDITION: ICD-10-CM

## 2024-07-24 DIAGNOSIS — Z11.1 SCREENING-PULMONARY TB: ICD-10-CM

## 2024-07-24 PROCEDURE — 99213 OFFICE O/P EST LOW 20 MIN: CPT | Performed by: NURSE PRACTITIONER

## 2024-07-24 NOTE — PROGRESS NOTES
Ambulatory Visit  Name: Ihsan Guillen      : 1990      MRN: [id number]  Encounter Provider: EUGENIO Jordan  Encounter Date: 2024   Encounter department: [facility]    Assessment & Plan   1. Slow transit constipation  Assessment & Plan:  Ongoing, intermittent issue.  Would recommend holding with iron supplement for now.  Discussed increasing fluid intake to at least 80 ounces per day, increasing dietary fiber, and doing MiraLAX daily.  2. Screening for cardiovascular condition  -     Comprehensive metabolic panel; Future  -     CBC and differential; Future  -     Lipid Panel with Direct LDL reflex; Future  -     Comprehensive metabolic panel  -     CBC and differential  -     Lipid Panel with Direct LDL reflex  3. Screening for thyroid disorder  -     TSH, 3rd generation with Free T4 reflex; Future  -     TSH, 3rd generation with Free T4 reflex  4. Screening-pulmonary TB  -     QuantiFERON-TB Gold Plus [facility]; Future  -     QuantiFERON-TB Gold Plus [facility]  5. Iron deficiency anemia, unspecified iron deficiency anemia type  Assessment & Plan:  Will repeat iron panel  Orders:  -     Fe+TIBC+Killian; Future  -     Fe+TIBC+Killian       History of Present Illness     Here today with complaints of abdominal issues.  This has been an intermittent problem for him.  Had GI workup including colonoscopy and endoscopy last year.  For the past month or so, he has had abdominal bloating and constipation.  Reports he moves his bowels every 2 to 3 days, and it is difficult to do so.  Stools are soft.  No blood in his stool, but reports a blue/green-colored stool x 1 episode.  He has been intermittently taking a laxative.            Review of Systems   Constitutional: Negative.    Gastrointestinal:  Positive for abdominal distention and constipation. Negative for abdominal pain, blood in stool, diarrhea and nausea.   All other systems reviewed and are negative.    Current Outpatient Medications on  "File Prior to Visit   Medication Sig Dispense Refill   • b complex vitamins capsule Take 1 capsule by mouth daily     • Multiple Vitamin (multivitamin) tablet Take 1 tablet by mouth daily       No current facility-administered medications on file prior to visit.      Social History     Tobacco Use   • Smoking status: Never     Passive exposure: Never   • Smokeless tobacco: Never   Vaping Use   • Vaping status: Never Used   Substance and Sexual Activity   • Alcohol use: Never   • Drug use: Never   • Sexual activity: Not on file     Objective     /60   Pulse 77   Temp (!) 97.2 °F (36.2 °C)   Resp 18   Ht 5' 5\" (1.651 m)   Wt 73.5 kg (162 lb)   SpO2 99%   BMI 26.96 kg/m²     Physical Exam  Vitals and nursing note reviewed.   Constitutional:       General: He is not in acute distress.     Appearance: Normal appearance. He is well-developed. He is not diaphoretic.   Eyes:      Conjunctiva/sclera: Conjunctivae normal.   Pulmonary:      Effort: Pulmonary effort is normal. No respiratory distress.   Abdominal:      General: Bowel sounds are normal. There is no distension.      Palpations: Abdomen is soft.      Tenderness: There is no abdominal tenderness.   Neurological:      Mental Status: He is alert.   Psychiatric:         Mood and Affect: Mood normal.         Behavior: Behavior normal.       Administrative Statements           "

## 2024-07-24 NOTE — ASSESSMENT & PLAN NOTE
Ongoing, intermittent issue.  Would recommend holding with iron supplement for now.  Discussed increasing fluid intake to at least 80 ounces per day, increasing dietary fiber, and doing MiraLAX daily.

## 2024-07-26 LAB
FERRITIN SERPL-MCNC: 196 NG/ML (ref 30–400)
IRON SATN MFR SERPL: 28 % (ref 15–55)
IRON SERPL-MCNC: 90 UG/DL (ref 38–169)
TIBC SERPL-MCNC: 326 UG/DL (ref 250–450)
UIBC SERPL-MCNC: 236 UG/DL (ref 111–343)

## 2024-07-27 LAB
ALBUMIN SERPL-MCNC: 4.5 G/DL (ref 4.1–5.1)
ALP SERPL-CCNC: 53 IU/L (ref 44–121)
ALT SERPL-CCNC: 53 IU/L (ref 0–44)
AST SERPL-CCNC: 42 IU/L (ref 0–40)
BASOPHILS # BLD AUTO: 0 X10E3/UL (ref 0–0.2)
BASOPHILS NFR BLD AUTO: 0 %
BILIRUB SERPL-MCNC: 0.7 MG/DL (ref 0–1.2)
BUN SERPL-MCNC: 18 MG/DL (ref 6–20)
BUN/CREAT SERPL: 18 (ref 9–20)
CALCIUM SERPL-MCNC: 9.4 MG/DL (ref 8.7–10.2)
CHLORIDE SERPL-SCNC: 103 MMOL/L (ref 96–106)
CHOLEST SERPL-MCNC: 235 MG/DL (ref 100–199)
CO2 SERPL-SCNC: 25 MMOL/L (ref 20–29)
CREAT SERPL-MCNC: 1 MG/DL (ref 0.76–1.27)
EGFR: 102 ML/MIN/1.73
EOSINOPHIL # BLD AUTO: 0.1 X10E3/UL (ref 0–0.4)
EOSINOPHIL NFR BLD AUTO: 1 %
ERYTHROCYTE [DISTWIDTH] IN BLOOD BY AUTOMATED COUNT: 18.5 % (ref 11.6–15.4)
GAMMA INTERFERON BACKGROUND BLD IA-ACNC: 0.03 IU/ML
GLOBULIN SER-MCNC: 2.9 G/DL (ref 1.5–4.5)
GLUCOSE SERPL-MCNC: 86 MG/DL (ref 70–99)
HCT VFR BLD AUTO: 39.6 % (ref 37.5–51)
HDLC SERPL-MCNC: 56 MG/DL
HGB BLD-MCNC: 12.2 G/DL (ref 13–17.7)
IMM GRANULOCYTES # BLD: 0 X10E3/UL (ref 0–0.1)
IMM GRANULOCYTES NFR BLD: 0 %
LDLC SERPL CALC-MCNC: 161 MG/DL (ref 0–99)
LDLC/HDLC SERPL: 2.9 RATIO (ref 0–3.6)
LYMPHOCYTES # BLD AUTO: 1.8 X10E3/UL (ref 0.7–3.1)
LYMPHOCYTES NFR BLD AUTO: 31 %
M TB IFN-G CD4+ T-CELLS BLD-ACNC: 0.52 IU/ML
M TB IFN-G CD4+ T-CELLS BLD-ACNC: 0.75 IU/ML
MCH RBC QN AUTO: 21 PG (ref 26.6–33)
MCHC RBC AUTO-ENTMCNC: 30.8 G/DL (ref 31.5–35.7)
MCV RBC AUTO: 68 FL (ref 79–97)
MICRODELETION SYND BLD/T FISH: NORMAL
MITOGEN IGNF BLD-ACNC: >10 IU/ML
MONOCYTES # BLD AUTO: 0.4 X10E3/UL (ref 0.1–0.9)
MONOCYTES NFR BLD AUTO: 7 %
NEUTROPHILS # BLD AUTO: 3.6 X10E3/UL (ref 1.4–7)
NEUTROPHILS NFR BLD AUTO: 61 %
PLATELET # BLD AUTO: 286 X10E3/UL (ref 150–450)
POTASSIUM SERPL-SCNC: 4 MMOL/L (ref 3.5–5.2)
PROT SERPL-MCNC: 7.4 G/DL (ref 6–8.5)
QUANTIFERON INCUBATION COMMENT: ABNORMAL
QUANTIFERON-TB GOLD PLUS: POSITIVE
RBC # BLD AUTO: 5.81 X10E6/UL (ref 4.14–5.8)
SERVICE CMNT-IMP: NORMAL
SL AMB VLDL CHOLESTEROL CALC: 18 MG/DL (ref 5–40)
SODIUM SERPL-SCNC: 142 MMOL/L (ref 134–144)
TRIGL SERPL-MCNC: 100 MG/DL (ref 0–149)
TSH SERPL DL<=0.005 MIU/L-ACNC: 1.68 UIU/ML (ref 0.45–4.5)
WBC # BLD AUTO: 5.9 X10E3/UL (ref 3.4–10.8)

## 2024-07-28 ENCOUNTER — PATIENT MESSAGE (OUTPATIENT)
Dept: FAMILY MEDICINE CLINIC | Facility: CLINIC | Age: 34
End: 2024-07-28

## 2024-07-28 DIAGNOSIS — D50.9 MICROCYTIC ANEMIA: ICD-10-CM

## 2024-07-28 DIAGNOSIS — R76.12 POSITIVE QUANTIFERON-TB GOLD TEST: Primary | ICD-10-CM

## 2024-08-07 ENCOUNTER — OFFICE VISIT (OUTPATIENT)
Dept: FAMILY MEDICINE CLINIC | Facility: CLINIC | Age: 34
End: 2024-08-07
Payer: COMMERCIAL

## 2024-08-07 VITALS
WEIGHT: 160 LBS | DIASTOLIC BLOOD PRESSURE: 60 MMHG | SYSTOLIC BLOOD PRESSURE: 104 MMHG | HEART RATE: 70 BPM | BODY MASS INDEX: 25.71 KG/M2 | OXYGEN SATURATION: 99 % | RESPIRATION RATE: 18 BRPM | HEIGHT: 66 IN | TEMPERATURE: 97.4 F

## 2024-08-07 DIAGNOSIS — Z78.9 UNKNOWN VARICELLA VACCINATION STATUS: ICD-10-CM

## 2024-08-07 DIAGNOSIS — Z00.00 ROUTINE ADULT HEALTH MAINTENANCE: Primary | ICD-10-CM

## 2024-08-07 PROCEDURE — 99395 PREV VISIT EST AGE 18-39: CPT | Performed by: NURSE PRACTITIONER

## 2024-08-07 NOTE — PROGRESS NOTES
Adult Annual Physical  Name: Ihsan Guillen      : 1990      MRN: [id number]  Encounter Provider: EUGENIO Jordan  Encounter Date: 2024   Encounter department: [facility]    Assessment & Plan   1. Routine adult health maintenance  2. Unknown varicella vaccination status  -     Varicella zoster antibody, IgG; Future  -     Varicella zoster antibody, IgG  Immunizations and preventive care screenings were discussed with patient today. Appropriate education was printed on patient's after visit summary.    Reviewed copy of immunizations on patient's phone, appears up-to-date aside from the only having received 1 varicella dose.  Patient questions whether he had the disease as a child.  Will check a titer in addition to repeat QuantiFERON gold.    Counseling:  Dental Health: discussed importance of regular tooth brushing, flossing, and dental visits.  Exercise: the importance of regular exercise/physical activity was discussed. Recommend exercise 3-5 times per week for at least 30 minutes.          History of Present Illness     Adult Annual Physical:  Patient presents for annual physical. Here today for CPE.  Patient had a QuantiFERON gold drawn and it was positive.  He also had the same test last year, which was negative.  He has a history of BCG vaccine.  No recent travel or TB symptoms.     Diet and Physical Activity:  - Diet/Nutrition: well balanced diet.  - Exercise: moderate cardiovascular exercise.    Depression Screening:  - PHQ-2 Score: 0    General Health:  - Sleep: sleeps well.  - Hearing: normal hearing bilateral ears.  - Dental: regular dental visits.    Review of Systems   Constitutional: Negative.    Respiratory: Negative.     Cardiovascular: Negative.    Gastrointestinal: Negative.    Neurological: Negative.      Current Outpatient Medications on File Prior to Visit   Medication Sig Dispense Refill   • b complex vitamins capsule Take 1 capsule by mouth daily     •  "Multiple Vitamin (multivitamin) tablet Take 1 tablet by mouth daily       No current facility-administered medications on file prior to visit.      Social History     Tobacco Use   • Smoking status: Never     Passive exposure: Never   • Smokeless tobacco: Never   Vaping Use   • Vaping status: Never Used   Substance and Sexual Activity   • Alcohol use: Never   • Drug use: Never   • Sexual activity: Not on file       Objective     /60   Pulse 70   Temp (!) 97.4 °F (36.3 °C)   Resp 18   Ht 5' 5.5\" (1.664 m)   Wt 72.6 kg (160 lb)   SpO2 99%   BMI 26.22 kg/m²     Physical Exam  Vitals and nursing note reviewed.   Constitutional:       Appearance: Normal appearance. He is well-developed.   HENT:      Head: Normocephalic and atraumatic.      Right Ear: Tympanic membrane and external ear normal.      Left Ear: Tympanic membrane and external ear normal.      Nose: Nose normal.      Mouth/Throat:      Pharynx: Oropharynx is clear.   Eyes:      Extraocular Movements: Extraocular movements intact.      Conjunctiva/sclera: Conjunctivae normal.      Pupils: Pupils are equal, round, and reactive to light.   Neck:      Vascular: No carotid bruit.   Cardiovascular:      Rate and Rhythm: Normal rate and regular rhythm.      Pulses: Normal pulses.      Heart sounds: Normal heart sounds. No murmur heard.  Pulmonary:      Effort: Pulmonary effort is normal.      Breath sounds: Normal breath sounds.   Abdominal:      General: Bowel sounds are normal.      Palpations: Abdomen is soft.      Tenderness: There is no abdominal tenderness.      Hernia: No hernia is present.   Musculoskeletal:         General: No deformity. Normal range of motion.      Cervical back: Normal range of motion and neck supple.   Skin:     General: Skin is warm and dry.      Capillary Refill: Capillary refill takes less than 2 seconds.   Neurological:      Mental Status: He is alert and oriented to person, place, and time.      Sensory: No sensory " deficit.      Coordination: Coordination normal.      Deep Tendon Reflexes: Reflexes normal.   Psychiatric:         Mood and Affect: Mood normal.         Behavior: Behavior normal.

## 2024-08-08 LAB — VZV IGG SER IA-ACNC: 1152 INDEX

## 2024-08-12 ENCOUNTER — TELEPHONE (OUTPATIENT)
Age: 34
End: 2024-08-12

## 2024-08-12 NOTE — TELEPHONE ENCOUNTER
[facility] and needs the results by Wednesday for employment. [facility] if [facility]

## 2024-08-12 NOTE — TELEPHONE ENCOUNTER
"I spoke to the patient and informed him on the [facility] website it says that the QuantiFERON®-TB Gold Plus \"Test not performed. The specimen submitted does not meet the,laboratory's criteria for acceptability (specimen was not received,within 16 hours of collection). Please resubmit if clinically,indicated.\". He stated that he will go get it redrawn tomorrow, but is starting work on Wednesday and cannot wait any longer to start. I spoke to Kathleen, she stated that she can write a note for him stating what is going on. The patient has [employer] access and is aware the note is being written. If he has any further questions or cannot access note, he will call us back.     Perla Polanco LPN    "

## 2024-08-12 NOTE — TELEPHONE ENCOUNTER
Patient called to follow up on TB test results. Patient also asked if his other physical form was ready for  yet. Patient stated it was the communicable diseases form for his physical. Patient stated he had tried calling [facility] in attempt to get his results but was unable to get through. Patient stated he needs his form back as soon as possible as he starts his new job Wednesday.    Please advise, thank you

## 2024-08-15 LAB
GAMMA INTERFERON BACKGROUND BLD IA-ACNC: 0 IU/ML
M TB IFN-G CD4+ T-CELLS BLD-ACNC: 0.34 IU/ML
M TB IFN-G CD4+ T-CELLS BLD-ACNC: 0.46 IU/ML
MITOGEN IGNF BLD-ACNC: >10 IU/ML
QUANTIFERON INCUBATION COMMENT: ABNORMAL
QUANTIFERON-TB GOLD PLUS: POSITIVE
SERVICE CMNT-IMP: NORMAL

## 2024-08-18 ENCOUNTER — APPOINTMENT (OUTPATIENT)
Dept: RADIOLOGY | Facility: CLINIC | Age: 34
End: 2024-08-18
Payer: COMMERCIAL

## 2024-08-18 DIAGNOSIS — R76.12 POSITIVE QUANTIFERON-TB GOLD TEST: ICD-10-CM

## 2024-08-18 PROCEDURE — 71046 X-RAY EXAM CHEST 2 VIEWS: CPT

## 2024-08-19 DIAGNOSIS — R76.12 POSITIVE QUANTIFERON-TB GOLD TEST: Primary | ICD-10-CM

## 2024-08-22 ENCOUNTER — TELEPHONE (OUTPATIENT)
Age: 34
End: 2024-08-22

## 2024-08-23 LAB — HBB GENE MUT ANL BLD/T: NORMAL

## 2024-08-28 ENCOUNTER — PATIENT MESSAGE (OUTPATIENT)
Dept: FAMILY MEDICINE CLINIC | Facility: CLINIC | Age: 34
End: 2024-08-28

## 2024-08-29 ENCOUNTER — TELEPHONE (OUTPATIENT)
Dept: INFECTIOUS DISEASES | Facility: CLINIC | Age: 34
End: 2024-08-29

## 2024-08-29 NOTE — TELEPHONE ENCOUNTER
Called and left message for patients spouse Perla today.   Was calling to offer patient appointment 9/5/24 at 9AM with Dr. Krueger at the [place] office.

## 2024-08-30 NOTE — PATIENT COMMUNICATION
Pt calling.  His job really needs this form filled out and signed.  He is wondering if Kathleen can sign it today and either send it back through [employer] or email it to him at [email or web address].  Pt would appreciate a call back when this is done.

## 2024-09-03 ENCOUNTER — TELEPHONE (OUTPATIENT)
Age: 34
End: 2024-09-03

## 2024-09-03 NOTE — TELEPHONE ENCOUNTER
Patient called in and would like to keep his original appointment on 9/25 . Patient was not notified about his appointment changing to 9/5/24 , patient stated he will not be able to make in . Please advise

## 2024-09-23 ENCOUNTER — TELEPHONE (OUTPATIENT)
Dept: INFECTIOUS DISEASES | Facility: CLINIC | Age: 34
End: 2024-09-23

## 2024-09-23 NOTE — TELEPHONE ENCOUNTER
Called and left message for patient today.   Informed patient to call the office back.   Was calling as patient is scheduled 9/25/24 at 2PM with Vonda at the [place] office. Due to a change in the providers schedule patient needs to be rescheduled. Office staff to help reschedule patient.

## 2024-09-23 NOTE — TELEPHONE ENCOUNTER
Pt returning call, offered 9/26 @ 11am per office, pt can only be seen from [address] due to work. Scheduled for next available 11/20 at 2pm + waitlist

## 2024-11-20 ENCOUNTER — CONSULT (OUTPATIENT)
Dept: INFECTIOUS DISEASES | Facility: CLINIC | Age: 34
End: 2024-11-20
Payer: COMMERCIAL

## 2024-11-20 ENCOUNTER — TELEPHONE (OUTPATIENT)
Age: 34
End: 2024-11-20

## 2024-11-20 VITALS
WEIGHT: 160 LBS | HEART RATE: 79 BPM | OXYGEN SATURATION: 99 % | BODY MASS INDEX: 26.66 KG/M2 | SYSTOLIC BLOOD PRESSURE: 110 MMHG | DIASTOLIC BLOOD PRESSURE: 64 MMHG | HEIGHT: 65 IN | RESPIRATION RATE: 16 BRPM | TEMPERATURE: 97.8 F

## 2024-11-20 DIAGNOSIS — Z22.7 TB LUNG, LATENT: Primary | ICD-10-CM

## 2024-11-20 DIAGNOSIS — R76.12 POSITIVE QUANTIFERON-TB GOLD TEST: ICD-10-CM

## 2024-11-20 PROCEDURE — 99205 OFFICE O/P NEW HI 60 MIN: CPT | Performed by: PHYSICIAN ASSISTANT

## 2024-11-20 NOTE — PROGRESS NOTES
Name: Ihsan Guillen      : 1990      MRN: [id number]  Encounter Provider: Vonda Macedo PA-C  Encounter Date: 2024   Encounter department: [facility]  :  Assessment & Plan  Positive QuantiFERON-TB Gold test  Patient had a QuantiFERON gold drawn July and 2024 that were positive.  This test can remain positive for life.    -patient was treated as below  -no additional laboratory testing or treatment is recommended  -no employment restrictions  -a letter of treatment completion has been provided to patient and in MyChart.    Orders:    Ambulatory Referral to Infectious Disease    TB lung, latent  He has a history of BCG [place] [facility] [place] [facility]. He was then treated for Latent TB by the [facility] in . No recent travel or TB symptoms.   -patient was treated in  with INH x 9 months. ([facility] medical treatment record attached to Media section)  -no additional laboratory testing or treatment is recommended  -no employment restrictions  -a letter of treatment completion has been provided to patient and in MyChart.         Above impression and plan discussed in lengthy detail with patient and [facility]. They concur with no additional testing or treatment required and letter of treatment completion provided.    History of Present Illness   Reason for Consult: + Quantiferon-TB Gold Test  HPI: Ihsan Guillen is a 34 y.o. year old male preemployment + Quantiferon-TB Gold Test.  Patient had a QuantiFERON gold drawn July an 2024 that were positive.  He has a history of BCG vaccine in birth country of [facility]. He was then treated for Latent TB by the [facility] in . No recent travel or TB symptoms. No fever, chills, night sweats, N/V/D, SOB, cough, CP, dysuria.    ROS: A complete review of systems are negative other than that noted in the HPI   Past Medical History   Past Medical History:   Diagnosis Date    Anemia   "   Bloating     Constipation     GERD (gastroesophageal reflux disease)     History of Helicobacter pylori infection     Irregular heart beat     per ekg 2020 peter no further workup     Past Surgical History:   Procedure Laterality Date    NO PAST SURGERIES       Family History   Problem Relation Age of Onset    Hypertension Mother     Diabetes Mother     No Known Problems Father       reports that he has never smoked. He has never been exposed to tobacco smoke. He has never used smokeless tobacco. He reports that he does not drink alcohol and does not use drugs.  Current Outpatient Medications on File Prior to Visit   Medication Sig Dispense Refill    b complex vitamins capsule Take 1 capsule by mouth daily      Multiple Vitamin (multivitamin) tablet Take 1 tablet by mouth daily       No current facility-administered medications on file prior to visit.   No Known Allergies        Antibiotics: none    Objective   /64   Pulse 79   Temp 97.8 °F (36.6 °C)   Resp 16   Ht 5' 5\" (1.651 m)   Wt 72.6 kg (160 lb)   SpO2 99%   BMI 26.63 kg/m²      General: Alert, interactive, appearing well, nontoxic, no acute distress.  Throat: Oropharynx moist without lesions.   Lungs: Clear to auscultation bilaterally; no audible wheezes, rhonchi or rales; respirations unlabored  Heart: RRR; no murmur, rub or gallop  Abdomen: Soft, non-tender, non-distended, positive bowel sounds.    Extremities: No clubbing, cyanosis or edema  Skin: No new rashes or lesions. No new draining wounds.    Lab Results: I have personally reviewed pertinent labs.  Lab Results   Component Value Date    K 4.0 07/25/2024     07/25/2024    CO2 25 07/25/2024    BUN 18 07/25/2024    CREATININE 1.00 07/25/2024    AST 42 (H) 07/25/2024    ALT 53 (H) 07/25/2024    EGFR 102 07/25/2024     Lab Results   Component Value Date    WBC 5.9 07/25/2024    HGB 12.2 (L) 07/25/2024    HCT 39.6 07/25/2024    MCV 68 (L) 07/25/2024     07/25/2024   No results " "found for: \"ESR\"      Radiology Results Review: I personally reviewed the following image studies in PACS and associated radiology reports: chest xray. My interpretation of the radiology images/reports is: 8/2024 CXR clear lungs, 1/2022 CXR clear lungs.    Administrative Statements   I have spent a total time of 60 minutes in caring for this patient on the day of the visit/encounter including Diagnostic results, Prognosis, Risks and benefits of tx options, Instructions for management, Patient and family education, Importance of tx compliance, Risk factor reductions, Impressions, Counseling / Coordination of care, Documenting in the medical record, Reviewing / ordering tests, medicine, procedures  , Obtaining or reviewing history  , and Communicating with other healthcare professionals .   "

## 2024-11-20 NOTE — TELEPHONE ENCOUNTER
Lo from [facility] called in requesting to speak zayda . Lo wanted to confirm if the paper works she sent over was received . Lo stated the office closes at 4pm today . Her call back # is [phone] ext [phone]

## 2024-11-20 NOTE — ASSESSMENT & PLAN NOTE
He has a history of BCG vaccine in birth country of [facility]. He was then treated for Latent TB by the [facility] in 2002. No recent travel or TB symptoms.   -patient was treated in 2002 with INH x 9 months. ([facility] medical treatment record attached to Media section)  -no additional laboratory testing or treatment is recommended  -no employment restrictions  -a letter of treatment completion has [facility] to patient and [facility]

## 2024-11-20 NOTE — TELEPHONE ENCOUNTER
Called [facility] today and spoke with Lo while patient was in office today.   Informed Lo I was calling to obtain patient TB treatment records. Lo states she will fax over records at this time and fax number provided.     Patient records received today from [facility] via fax and scanned in to patients chart.

## 2024-11-20 NOTE — ASSESSMENT & PLAN NOTE
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  This test can remain positive for life.    -patient was treated as below  -no additional laboratory testing or treatment is recommended  -no employment restrictions  -a letter of treatment completion has been provided to patient and in MyChart.    Orders:    Ambulatory Referral to Infectious Disease

## 2024-11-24 ENCOUNTER — PATIENT MESSAGE (OUTPATIENT)
Dept: FAMILY MEDICINE CLINIC | Facility: CLINIC | Age: 34
End: 2024-11-24

## 2024-11-24 DIAGNOSIS — E78.5 DYSLIPIDEMIA: Primary | ICD-10-CM

## 2024-11-24 DIAGNOSIS — Z13.6 SCREENING FOR CARDIOVASCULAR CONDITION: ICD-10-CM

## 2024-11-26 RX ORDER — ROSUVASTATIN CALCIUM 10 MG/1
10 TABLET, COATED ORAL DAILY
Qty: 30 TABLET | Refills: 5 | Status: SHIPPED | OUTPATIENT
Start: 2024-11-26

## 2025-04-04 ENCOUNTER — NURSE TRIAGE (OUTPATIENT)
Age: 35
End: 2025-04-04

## 2025-04-04 ENCOUNTER — PATIENT MESSAGE (OUTPATIENT)
Dept: FAMILY MEDICINE CLINIC | Facility: CLINIC | Age: 35
End: 2025-04-04

## 2025-04-04 NOTE — TELEPHONE ENCOUNTER
"Regarding: lymph node pain  ----- Message from Rebeca RASMUSSEN sent at 4/4/2025  4:58 PM EDT -----  My Chart message:  \"Hope all is well. I was reaching out because my right lymph node has been bothering for the past two weeks. It is come to the point where the pain reaches my teeth and ear. I was wondering if there is any medication that you can provide for me to  at the pharmacy in [facility] in [place].\"    "

## 2025-04-04 NOTE — TELEPHONE ENCOUNTER
"Reason for Disposition  • Large node present > 2 weeks    Answer Assessment - Initial Assessment Questions  1. LOCATION: \"Where is the swollen node located?\" \"Is the matching node on the other side of the body also swollen?\"       Right side of neck  2. SIZE: \"How big is the node?\" (e.g., inches or centimeters; or compared to common objects such as pea, bean, marble, golf ball)       Unknown tender to touch   3. ONSET: \"When did the swelling start?\"       Two weeks ago  4. NECK NODES: \"Is there a sore throat, runny nose or other symptoms of a cold?\"       A week ago sore throat,no symptoms of sore throat today   5. GROIN OR ARMPIT NODES: \"Is there a sore, scratch, cut or painful red area on that arm or leg?\"       No   6. FEVER: \"Do you have a fever?\" If Yes, ask: \"What is it, how was it measured, and when did it start?\"       No   7. CAUSE: \"What do you think is causing the swollen lymph nodes?\"      Unknown   8. OTHER SYMPTOMS: \"Do you have any other symptoms?\"  No    Protocols used: Lymph Nodes - Swollen-Adult-OH    "

## 2025-04-04 NOTE — TELEPHONE ENCOUNTER
FOLLOW UP:     REASON FOR CONVERSATION: swollen lymph nodes neck     SYMPTOMS: right side of neck swollen,symptoms started two weeks ago.tender to touch     OTHER: Patient had sore throat last week,no symptoms today.patient afebrile.Patient asymptomatic     DISPOSITION: No disposition on file. Appt scheduled for 4/9

## 2025-04-09 ENCOUNTER — OFFICE VISIT (OUTPATIENT)
Dept: FAMILY MEDICINE CLINIC | Facility: CLINIC | Age: 35
End: 2025-04-09
Payer: COMMERCIAL

## 2025-04-09 VITALS
BODY MASS INDEX: 27.99 KG/M2 | HEIGHT: 65 IN | SYSTOLIC BLOOD PRESSURE: 90 MMHG | WEIGHT: 168 LBS | TEMPERATURE: 96 F | RESPIRATION RATE: 16 BRPM | HEART RATE: 60 BPM | DIASTOLIC BLOOD PRESSURE: 52 MMHG

## 2025-04-09 DIAGNOSIS — R59.1 LYMPHADENOPATHY: Primary | ICD-10-CM

## 2025-04-09 DIAGNOSIS — E78.5 DYSLIPIDEMIA: ICD-10-CM

## 2025-04-09 PROCEDURE — 99213 OFFICE O/P EST LOW 20 MIN: CPT | Performed by: NURSE PRACTITIONER

## 2025-04-09 RX ORDER — ROSUVASTATIN CALCIUM 10 MG/1
10 TABLET, COATED ORAL DAILY
Qty: 90 TABLET | Refills: 2 | Status: SHIPPED | OUTPATIENT
Start: 2025-04-09

## 2025-04-09 RX ORDER — CEPHALEXIN 500 MG/1
500 CAPSULE ORAL EVERY 12 HOURS SCHEDULED
Qty: 14 CAPSULE | Refills: 0 | Status: SHIPPED | OUTPATIENT
Start: 2025-04-09 | End: 2025-04-16

## 2025-04-09 NOTE — PROGRESS NOTES
"Name: Ihsan Gulilen      : 1990      MRN: [id number]  Encounter Provider: EUGENIO Jordan  Encounter Date: 2025   Encounter department: [facility]  :  Assessment & Plan  Lymphadenopathy  Will cover with Keflex and check CBC and US  Orders:  •  cephalexin (KEFLEX) 500 mg capsule; Take 1 capsule (500 mg total) by mouth every 12 (twelve) hours for 7 days  •  US head neck soft tissue; Future  •  CBC and differential; Future    Dyslipidemia  He was not aware that he needed to continue taking the statin.  Discussed that this is a long term medication. Aware to restart and repeat labs after 1 month    Orders:  •  rosuvastatin (CRESTOR) 10 MG tablet; Take 1 tablet (10 mg total) by mouth daily  •  Comprehensive metabolic panel; Future  •  Lipid Panel with Direct LDL reflex; Future           History of Present Illness   He has had a painful, swollen lymph node in the right side of his neck for the past few weeks.  States this has happened in the past and sometimes feels a lump behind his right ear.  No associated congestion, allergy symptoms, sore throat, or dental problems.  He sees a dentist regularly.          Review of Systems   Constitutional: Negative.  Negative for chills, diaphoresis, fatigue and fever.   HENT:          See HPI   Hematological:  Positive for adenopathy.   All other systems reviewed and are negative.      Objective   BP 90/52   Pulse 60   Temp (!) 96 °F (35.6 °C)   Resp 16   Ht 5' 5\" (1.651 m)   Wt 76.2 kg (168 lb)   BMI 27.96 kg/m²      Physical Exam  Vitals and nursing note reviewed.   Constitutional:       General: He is not in acute distress.     Appearance: Normal appearance.   HENT:      Head: Normocephalic and atraumatic.      Right Ear: Tympanic membrane normal.      Left Ear: Tympanic membrane normal.      Nose: Nose normal.      Mouth/Throat:      Pharynx: Oropharynx is clear.   Eyes:      Conjunctiva/sclera: Conjunctivae normal.   Neck:      " Vascular: No carotid bruit.      Comments: Right anterior LN tender, not enlarged   Cardiovascular:      Rate and Rhythm: Normal rate and regular rhythm.      Pulses: Normal pulses.      Heart sounds: Normal heart sounds. No murmur heard.  Pulmonary:      Effort: Pulmonary effort is normal.      Breath sounds: Normal breath sounds.   Lymphadenopathy:      Cervical: No cervical adenopathy.   Skin:     General: Skin is warm and dry.   Neurological:      Mental Status: He is alert.   Psychiatric:         Mood and Affect: Mood normal.         Behavior: Behavior normal.

## 2025-06-12 ENCOUNTER — APPOINTMENT (OUTPATIENT)
Dept: RADIOLOGY | Facility: CLINIC | Age: 35
End: 2025-06-12
Attending: ORTHOPAEDIC SURGERY
Payer: COMMERCIAL

## 2025-06-12 VITALS — WEIGHT: 167 LBS | BODY MASS INDEX: 27.82 KG/M2 | HEIGHT: 65 IN

## 2025-06-12 DIAGNOSIS — M25.561 RIGHT KNEE PAIN, UNSPECIFIED CHRONICITY: ICD-10-CM

## 2025-06-12 DIAGNOSIS — Z01.89 ENCOUNTER FOR LOWER EXTREMITY COMPARISON IMAGING STUDY: ICD-10-CM

## 2025-06-12 DIAGNOSIS — M76.891 TENDINITIS OF RIGHT KNEE: Primary | ICD-10-CM

## 2025-06-12 PROCEDURE — 73564 X-RAY EXAM KNEE 4 OR MORE: CPT

## 2025-06-12 PROCEDURE — 99213 OFFICE O/P EST LOW 20 MIN: CPT | Performed by: ORTHOPAEDIC SURGERY

## 2025-06-12 PROCEDURE — 73562 X-RAY EXAM OF KNEE 3: CPT

## 2025-06-12 NOTE — PROGRESS NOTES
Patient Name: Ihsan Guillen      : 1990       MRN: [id number]   Encounter Provider: Yvon Dowell MD   Encounter Date: 25  Encounter department: [facility]         Assessment & Plan  Tendinitis of right knee  We reviewed Ihsan's knee x-rays today, which do not demonstrate acute osseous abnormalities or degenerative changes. Upon history and examination today, Ihsan does not demonstrate joint line/MCL/bony/pes anserine tenderness, joint effusion, painful or limited ROM, or mechanical symptoms. The patient's knee is stable and he has negative Ivette's and Thessaly's. As such, I would recommend starting a course of formal PT to treat his functional knee pain and any muscular imbalances. He does report symptomatic benefit from stretching on his own. The patient can use OTC medications and ice/heat as needed for pain control. He can try knee bracing for activities, though this is not necessary. We will follow up with Ihsan in 6 weeks for recheck. If his symptoms persist or worsen following a course of PT, I would recommend obtaining an MRI for further investigation of his knee pain. In the meantime, we encouraged him to continue activities as tolerated, using pain as a guide.          _____________________________________________________  CHIEF COMPLAINT:  Chief Complaint   Patient presents with    Right Knee - Pain         SUBJECTIVE:  Ihsan Guillen is a 34 y.o. male who presents for initial evaluation of right knee pain localized to the medial aspect.  The patient notes pain started at least 3 years ago without any specific injury/trauma.  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  The patient denies pain while sprinting or playing soccer, but will note pain with walking with the  "knee in full extension or twisting motions with his feet planted.  The patient denies any significant swelling, instability, weakness, and numbness/tingling over this time. The patient has not required use of a brace and is not currently using any home therapies.  He notes that stretching does seem to help his symptoms.      Right Knee Surgical History:  None    PAST MEDICAL HISTORY:  Past Medical History[1]    PAST SURGICAL HISTORY:  Past Surgical History[2]    FAMILY HISTORY:  Family History[3]    SOCIAL HISTORY:  Social History[4]    MEDICATIONS:  Current Medications[5]    ALLERGIES:  Allergies[6]    LABS:  HgA1c: No results found for: \"HGBA1C\"  BMP:   Lab Results   Component Value Date    K 4.0 07/25/2024    CO2 25 07/25/2024     07/25/2024    BUN 18 07/25/2024    CREATININE 1.00 07/25/2024     CBC: No components found for: \"CBC\"    _____________________________________________________  Review of systems: ROS is negative other than that noted in the HPI.  Constitutional: Negative for fatigue and fever.   HENT: Negative for sore throat.    Respiratory: Negative for shortness of breath.    Cardiovascular: Negative for chest pain.   Gastrointestinal: Negative for abdominal pain.   Endocrine: Negative for cold intolerance and heat intolerance.   Genitourinary: Negative for flank pain.   Musculoskeletal: Negative for back pain.   Skin: Negative for rash.   Allergic/Immunologic: Negative for immunocompromised state.   Neurological: Negative for dizziness.   Psychiatric/Behavioral: Negative for agitation.     Knee Exam:   No significant skin lesions or deformity  No joint effusion  Range of motion from 0 to 140 without pain  No medial or lateral joint line tenderness. No extensor mechanism, pes anserine, MCL, distal IT band, or bony tenderness  Negative Ivette's  Negative Thessaly's  Knee is stable to varus stress, valgus stress, Lachman, and posterior drawer.    Patella tracks centrally without palpable " crepitus  Calf compartments are soft and supple  2+ DP and PT pulses with brisk capillary refill to the toes  Sural, saphenous, tibial, superficial, and deep peroneal motor and sensory distributions intact  Sensation light touch intact distally      Physical exam:  General/Constitutional: NAD, well developed, well nourished  HENT: Normocephalic, atraumatic  CV: Intact distal pulses, regular rate  Resp: No respiratory distress or labored breathing  Abdomen: soft, nondistended   Lymphatic: No lymphadenopathy palpated  Neuro: Alert and Oriented x 3, no focal deficits  Psych: Normal mood, normal affect  Skin: Warm, dry, no rashes, no erythema  _____________________________________________________  STUDIES REVIEWED:  X-rays of the right knee reviewed and interpreted today. These show no acute osseous abnormalities or significant degenerative changes. The patella is well-centered on the trochlea.        PROCEDURES PERFORMED:  No procedures performed today.    Scribe Attestation      I,:  Heidi Meyer PA-C am acting as a scribe while in the presence of the attending physician.:       I,:  Yvon Dowell MD personally performed the services described in this documentation    as scribed in my presence.:                    [1]   Past Medical History:  Diagnosis Date    Anemia     Bloating     Constipation     GERD (gastroesophageal reflux disease)     History of Helicobacter pylori infection     Irregular heart beat     per ekg 2020 peter no further workup   [2]   Past Surgical History:  Procedure Laterality Date    NO PAST SURGERIES     [3]   Family History  Problem Relation Name Age of Onset    Hypertension Mother      Diabetes Mother      No Known Problems Father     [4]   Social History  Tobacco Use    Smoking status: Never     Passive exposure: Never    Smokeless tobacco: Never   Vaping Use    Vaping status: Never Used   Substance Use Topics    Alcohol use: Never    Drug use: Never   [5]   Current Outpatient  Medications:     b complex vitamins capsule, Take 1 capsule by mouth in the morning., Disp: , Rfl:     Multiple Vitamin (multivitamin) tablet, Take 1 tablet by mouth in the morning., Disp: , Rfl:     rosuvastatin (CRESTOR) 10 MG tablet, Take 1 tablet (10 mg total) by mouth daily, Disp: 90 tablet, Rfl: 2  [6] No Known Allergies